# Patient Record
Sex: FEMALE | Race: WHITE | URBAN - METROPOLITAN AREA
[De-identification: names, ages, dates, MRNs, and addresses within clinical notes are randomized per-mention and may not be internally consistent; named-entity substitution may affect disease eponyms.]

---

## 2019-03-08 ENCOUNTER — TRANSFERRED RECORDS (OUTPATIENT)
Dept: HEALTH INFORMATION MANAGEMENT | Facility: CLINIC | Age: 44
End: 2019-03-08

## 2019-03-12 ENCOUNTER — PRE VISIT (OUTPATIENT)
Dept: OPHTHALMOLOGY | Facility: CLINIC | Age: 44
End: 2019-03-12

## 2019-03-12 NOTE — TELEPHONE ENCOUNTER
FUTURE VISIT INFORMATION      FUTURE VISIT INFORMATION:    Date: 4/3/19    Time: 8:00am    Location: [facility]  REFERRAL INFORMATION:    Referring provider:  Dr. Bree Oconnor    Referring providers clinic:  [facility]    Reason for visit/diagnosis  Bi-temporal Quadrant Anopia    RECORDS REQUESTED FROM:       Clinic name Comments Records Status Imaging Status   [facility] Records received 4/2- sent to scanning Requested

## 2019-04-03 ENCOUNTER — OFFICE VISIT (OUTPATIENT)
Dept: OPHTHALMOLOGY | Facility: CLINIC | Age: 44
End: 2019-04-03
Payer: COMMERCIAL

## 2019-04-03 DIAGNOSIS — H53.40 VISUAL FIELD DEFECT: Primary | ICD-10-CM

## 2019-04-03 DIAGNOSIS — H53.10 SUBJECTIVE VISUAL DISTURBANCE: Primary | ICD-10-CM

## 2019-04-03 DIAGNOSIS — H53.10 SUBJECTIVE VISUAL DISTURBANCE: ICD-10-CM

## 2019-04-03 PROBLEM — Z87.59 HISTORY OF PRE-ECLAMPSIA: Status: ACTIVE | Noted: 2018-06-18

## 2019-04-03 PROBLEM — Z12.4 SCREENING FOR MALIGNANT NEOPLASM OF CERVIX: Status: ACTIVE | Noted: 2019-04-03

## 2019-04-03 PROBLEM — E55.9 VITAMIN D DEFICIENCY: Status: ACTIVE | Noted: 2019-04-03

## 2019-04-03 PROBLEM — Z13.6 SCREENING FOR CARDIOVASCULAR CONDITION: Status: ACTIVE | Noted: 2019-04-03

## 2019-04-03 PROBLEM — J30.9 ALLERGIC RHINITIS: Status: ACTIVE | Noted: 2019-04-03

## 2019-04-03 PROBLEM — O09.90 HIGH RISK PREGNANCY, ANTEPARTUM: Status: ACTIVE | Noted: 2017-04-27

## 2019-04-03 PROBLEM — G43.009 MIGRAINE WITHOUT AURA AND WITHOUT STATUS MIGRAINOSUS, NOT INTRACTABLE: Status: ACTIVE | Noted: 2018-06-18

## 2019-04-03 PROBLEM — O35.FXX0: Status: ACTIVE | Noted: 2017-04-18

## 2019-04-03 PROBLEM — R50.82 POST-PROCEDURAL FEVER: Status: ACTIVE | Noted: 2017-07-29

## 2019-04-03 RX ORDER — ALBUTEROL SULFATE 90 UG/1
1 AEROSOL, METERED RESPIRATORY (INHALATION)
COMMUNITY
Start: 2018-10-17

## 2019-04-03 RX ORDER — CETIRIZINE HYDROCHLORIDE 10 MG/1
TABLET ORAL
COMMUNITY
Start: 2010-02-17

## 2019-04-03 ASSESSMENT — CONF VISUAL FIELD
METHOD: COUNTING FINGERS
OS_NORMAL: 1
OD_NORMAL: 1

## 2019-04-03 ASSESSMENT — TONOMETRY
IOP_UNABLETOASSESS: 1
OS_IOP_MMHG: 21
IOP_METHOD: ICARE
OD_IOP_MMHG: 21

## 2019-04-03 ASSESSMENT — EXTERNAL EXAM - LEFT EYE: OS_EXAM: NORMAL

## 2019-04-03 ASSESSMENT — EXTERNAL EXAM - RIGHT EYE: OD_EXAM: NORMAL

## 2019-04-03 ASSESSMENT — CUP TO DISC RATIO
OD_RATIO: 0.4
OS_RATIO: 0.45

## 2019-04-03 ASSESSMENT — VISUAL ACUITY
OS_CC: 20/20
CORRECTION_TYPE: CONTACTS
METHOD: SNELLEN - LINEAR
OD_CC: 20/20

## 2019-04-03 ASSESSMENT — SLIT LAMP EXAM - LIDS
COMMENTS: NORMAL
COMMENTS: NORMAL

## 2019-04-03 NOTE — LETTER
4/3/2019         RE:  :  MRN: Emilia Fournier  1975  [id number]     Dear Dr. Peterson,    Thank you for asking me to see your very pleasant patient, Emilia Fournier, in neuro-ophthalmic consultation.  I would like to thank you for sending your records and I have summarized them in the history of present illness.  My assessment and plan are below.  For further details, please see my attached clinic note.      Assessment & Plan     Emilia Fournier is a 44 year old female with the following diagnoses:   1. Visual field defect    2. Subjective visual disturbance       Referred by Dr. 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 She is being followed for glaucoma suspect. Emilia's father had glaucoma. 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     She has not noticed any decrease in the peripheral vision. She feels that her distance vision in the right eye has been slowly decreasing over the past few years. She has been getting updated gas permeable lenses.     She does get some migraine headaches but nothing out of the ordinary. She did gain weight from her recent pregnancy but denies any brittle hair, feeling hot/cold, hair loss.     No neuro imaging yet.     Past medical history: asthma and allergies  Meds: albuterol, advair, zyrtec   Past ocular history: none     GTOP visual field testing shows questionable early superior arcuate defect left eye. Normal visual fields right eye. Retinal nerve fiber layer OCT shows borderline bilateral superotemporal thinning.  Her intraocular pressure was 21 both eyes. Her color vision was normal.      We discussed her visual fields.  The visual field taken at Dr. Peterson's office sounds like an FDT.  She thought the threshold testing here with static perimetry was much easier.  She has borderline thinning of her retinal nerve fiber layer and borderline intraocular pressure. She has a strong family history of  glaucoma.  It seems most likely that she has glaucoma.  We discussed neuroimaging and she would like to pursue.  Will obtain MRI.  If normal, then would not pursue further work-up.      Again, thank you for allowing me to participate in the care of your patient.      Sincerely,    Skip Andrade MD  Professor  Ophthalmology Residency   Director of Neuro-Ophthalmology  [employer] [facility] [employer] Endowed Chair  Departments of Ophthalmology, Neurology, and Neurosurgery  [facility] [address] [place]  [address]  T - [phone]  F - [phone]  ISIS [email or web address]

## 2019-04-03 NOTE — NURSING NOTE
Chief Complaints and History of Present Illnesses   Patient presents with     Consult For     Bi temporal quadrant anopia     Chief Complaint(s) and History of Present Illness(es)     Consult For     Laterality: both eyes    Quality: blurred vision    Associated symptoms: Negative for flashes, floaters, tearing and dryness    Comments: Bi temporal quadrant anopia              Comments     Blurred vision OU, OD>OS, gradually getting worse over the last few years, just found out vision issues with peripheral vision less than one months go by OD. H/o migraines, takes aleve for relief.     Suzan Jordan April 3, 2019 7:26 AM

## 2019-04-12 ENCOUNTER — TRANSFERRED RECORDS (OUTPATIENT)
Dept: HEALTH INFORMATION MANAGEMENT | Facility: CLINIC | Age: 44
End: 2019-04-12

## 2019-04-15 ENCOUNTER — TELEPHONE (OUTPATIENT)
Dept: OPHTHALMOLOGY | Facility: CLINIC | Age: 44
End: 2019-04-15

## 2019-04-15 NOTE — TELEPHONE ENCOUNTER
Called patient and informed her that her MRI came back normal, which is great. She will continue to f/u with her local eye doctor for her glaucoma.

## 2019-04-19 ENCOUNTER — HEALTH MAINTENANCE LETTER (OUTPATIENT)
Age: 44
End: 2019-04-19

## 2020-03-01 ENCOUNTER — HEALTH MAINTENANCE LETTER (OUTPATIENT)
Age: 45
End: 2020-03-01

## 2020-07-08 NOTE — PROGRESS NOTES
Assessment & Plan     Emilia Fournier is a 44 year old female with the following diagnoses:   1. Visual field defect    2. Subjective visual disturbance       Referred by Dr. 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 She is being followed for glaucoma suspect. Emilia's father had glaucoma. On recent visual field testing in March of 2019, Dr. Peterson documented bitemporal superior quandrantopia.     She has not noticed any decrease in the peripheral vision. She feels that her distance vision in the right eye has been slowly decreasing over the past few years. She has been getting updated gas permeable lenses.     She does get some migraine headaches but nothing out of the ordinary. She did gain weight from her recent pregnancy but denies any brittle hair, feeling hot/cold, hair loss.     No neuro imaging yet.     Past medical history: asthma and allergies  Meds: albuterol, advair, zyrtec   Past ocular history: none     GTOP visual field testing shows questionable early superior arcuate defect left eye. Normal visual fields right eye. Retinal nerve fiber layer OCT shows borderline bilateral superotemporal thinning.  Her intraocular pressure was 21 both eyes. Her color vision was normal.      We discussed her visual fields.  The visual field taken at Dr. Peterson's office sounds like an FDT.  She thought the threshold testing here with static perimetry was much easier.  She has borderline thinning of her retinal nerve fiber layer and borderline intraocular pressure. She has a strong family history of glaucoma.  It seems most likely that she has glaucoma.  We discussed neuroimaging and she would like to pursue.  Will obtain MRI.  If normal, then would not pursue further work-up.           Attending Physician Attestation:  Complete documentation of historical and exam elements from today's encounter can be found in the full encounter summary report (not reduplicated in this progress note).  I personally  obtained the chief complaint(s) and history of present illness.  I confirmed and edited as necessary the review of systems, past medical/surgical history, family history, social history, and examination findings as documented by others; and I examined the patient myself.  I personally reviewed the relevant tests, images, and reports as documented above.  I formulated and edited as necessary the assessment and plan and discussed the findings and management plan with the patient and family. - Skip Zhang MD  Neuro-ophthalmology Fellow         none quit on own

## 2020-12-14 ENCOUNTER — HEALTH MAINTENANCE LETTER (OUTPATIENT)
Age: 45
End: 2020-12-14

## 2021-02-21 ENCOUNTER — HEALTH MAINTENANCE LETTER (OUTPATIENT)
Age: 46
End: 2021-02-21

## 2021-04-17 ENCOUNTER — HEALTH MAINTENANCE LETTER (OUTPATIENT)
Age: 46
End: 2021-04-17

## 2021-10-02 ENCOUNTER — HEALTH MAINTENANCE LETTER (OUTPATIENT)
Age: 46
End: 2021-10-02

## 2022-03-13 ENCOUNTER — HEALTH MAINTENANCE LETTER (OUTPATIENT)
Age: 47
End: 2022-03-13

## 2022-05-08 ENCOUNTER — HEALTH MAINTENANCE LETTER (OUTPATIENT)
Age: 47
End: 2022-05-08

## 2023-01-14 ENCOUNTER — HEALTH MAINTENANCE LETTER (OUTPATIENT)
Age: 48
End: 2023-01-14

## 2023-04-23 ENCOUNTER — HEALTH MAINTENANCE LETTER (OUTPATIENT)
Age: 48
End: 2023-04-23

## 2023-06-02 ENCOUNTER — HEALTH MAINTENANCE LETTER (OUTPATIENT)
Age: 48
End: 2023-06-02